# Patient Record
Sex: MALE | Race: WHITE
[De-identification: names, ages, dates, MRNs, and addresses within clinical notes are randomized per-mention and may not be internally consistent; named-entity substitution may affect disease eponyms.]

---

## 2021-03-13 ENCOUNTER — HOSPITAL ENCOUNTER (EMERGENCY)
Dept: HOSPITAL 61 - ER | Age: 46
Discharge: HOME | End: 2021-03-13
Payer: SELF-PAY

## 2021-03-13 VITALS — HEIGHT: 70 IN | WEIGHT: 220.46 LBS | BODY MASS INDEX: 31.56 KG/M2

## 2021-03-13 VITALS — SYSTOLIC BLOOD PRESSURE: 117 MMHG | DIASTOLIC BLOOD PRESSURE: 64 MMHG

## 2021-03-13 DIAGNOSIS — F12.90: ICD-10-CM

## 2021-03-13 DIAGNOSIS — R06.02: Primary | ICD-10-CM

## 2021-03-13 DIAGNOSIS — R11.0: ICD-10-CM

## 2021-03-13 DIAGNOSIS — F17.200: ICD-10-CM

## 2021-03-13 DIAGNOSIS — F19.90: ICD-10-CM

## 2021-03-13 DIAGNOSIS — R19.7: ICD-10-CM

## 2021-03-13 DIAGNOSIS — Z90.89: ICD-10-CM

## 2021-03-13 DIAGNOSIS — Z20.822: ICD-10-CM

## 2021-03-13 DIAGNOSIS — R05: ICD-10-CM

## 2021-03-13 LAB
INFLUENZA A PATIENT: NEGATIVE
INFLUENZA B PATIENT: NEGATIVE

## 2021-03-13 PROCEDURE — 99285 EMERGENCY DEPT VISIT HI MDM: CPT

## 2021-03-13 PROCEDURE — C9803 HOPD COVID-19 SPEC COLLECT: HCPCS

## 2021-03-13 PROCEDURE — U0003 INFECTIOUS AGENT DETECTION BY NUCLEIC ACID (DNA OR RNA); SEVERE ACUTE RESPIRATORY SYNDROME CORONAVIRUS 2 (SARS-COV-2) (CORONAVIRUS DISEASE [COVID-19]), AMPLIFIED PROBE TECHNIQUE, MAKING USE OF HIGH THROUGHPUT TECHNOLOGIES AS DESCRIBED BY CMS-2020-01-R: HCPCS

## 2021-03-13 PROCEDURE — 87804 INFLUENZA ASSAY W/OPTIC: CPT

## 2021-03-13 PROCEDURE — 71045 X-RAY EXAM CHEST 1 VIEW: CPT

## 2021-03-13 RX ADMIN — DEXAMETHASONE SODIUM PHOSPHATE ONE MG: 4 INJECTION, SOLUTION INTRAMUSCULAR; INTRAVENOUS at 10:49

## 2021-03-13 NOTE — ED.ADGEN
Past Medical History


Past Medical History:  No Pertinent History


Past Surgical History:  Appendectomy


Smoking Status:  Current Every Day Smoker


Alcohol Use:  Occasionally


Drug Use:  Heroin, Marijuana, Methamphetamine





General Adult


EDM:


Chief Complaint:  MULTIPLE COMPLAINTS





HPI:


HPI:


Patient is 45-year-old male who presents to the emergency room complaining of 

cough, body aches, diarrhea, nausea, and generally feeling unwell.  This is been

ongoing for the last 4 days and his stayed the same.  He denies any, chest pain,

URI symptoms, fever, chills, sweats, abdominal pain, nausea, vomiting.  He is 

unsure if he has been exposed to anybody who has been ill.  He has not tried to 

take anything for symptoms at home.  He does not have any issues getting out of 

bed or walking long distances.  He does not have any medical problems.





Review of Systems:


Review of Systems:


Complete ROS is negative unless otherwise documented in HPI





Current Medications:





Current Medications








 Medications


  (Trade)  Dose


 Ordered  Sig/Kaleb  Start Time


 Stop Time Status Last Admin


Dose Admin


 


 Dexamethasone


 Sodium Phosphate


  (Decadron)  10 mg  1X  ONCE  3/13/21 10:30


 3/13/21 10:31 DC 3/13/21 10:49


10 MG











Allergies:


Allergies:





Allergies








Coded Allergies Type Severity Reaction Last Updated Verified


 


  No Known Drug Allergies    4/21/16 No











Physical Exam:


PE:


General: Awake, alert, NAD. Well Nourished, well hydrated. Cooperative


HEENT: Atraumatic, EOMI, PERRL, airway patent, moist oral mucosa


Neck: Supple, trachea midline


Respiratory: CTA bilaterally, normal effort, no wheezing/crackles


CV: RRR, no murmur, cap refill <2


GI: Soft, nondistended, nontender, no masses


MSK: No obvious deformities


Skin: Warm, dry, intact


Neuro: A&O x3, speech NL, sensory and motor grossly intact, no focal deficits


Psych: Normal affect, normal mood, not suicidal or homicidal





Current Patient Data:


Labs:





                                Laboratory Tests








Test


 3/13/21


10:52


 


Influenza Type A Antigen


 Negative


(NEGATIVE)


 


Influenza Type B Antigen


 Negative


(NEGATIVE)








Vital Signs:





                                   Vital Signs








  Date Time  Temp Pulse Resp B/P (MAP) Pulse Ox O2 Delivery O2 Flow Rate FiO2


 


3/13/21 09:20 99.0 89 16 122/76 (91) 95 Room Air  





 99.0       











EKG:


EKG:


[]





Heart Score:


C/O Chest Pain:  N/A


Risk Factors:


Risk Factors:  DM, Current or recent (<one month) smoker, HTN, HLP, family 

history of CAD, obesity.


Risk Scores:


Score 0 - 3:  2.5% MACE over next 6 weeks - Discharge Home


Score 4 - 6:  20.3% MACE over next 6 weeks - Admit for Clinical Observation


Score 7 - 10:  72.7% MACE over next 6 weeks - Early Invasive Strategies





Radiology/Procedures:


Radiology/Procedures:


[]





Course & Med Decision Making:


Course & Med Decision Making


Pertinent Labs and Imaging studies reviewed. (See chart for details)





Patient is a 45-year-old male who presents to the emergency room with viral 

symptoms.  At this time there is concern for the novel coronavirus 19.  Risk 

stratifying work-up was ordered including chest x-ray, d-dimer, CPK, CRP, LDH, 

troponin, ferritin, CBC, CMP.  Due to concern of COVID-19 I have discussed the 

importance of quarantining with the patient.  I have discussed with them that 

they should avoid grocery stores, gas stations, pharmacies, work, 

friends/family's homes.  I discussed with him that it is important that they do 

not expose themselves to anyone else for the next 14 days.  Chest x-ray does not

 show infiltrates at this time and patient will not be treated with empiric 

antibiotics.  I have discussed with the patient the course of the illness and we

 have discussed strict return precautions.  At this time patient does not need 

admission as they are stable, however it is possible that they may get worse 

over the next few days and we have discussed the importance of coming back if t

hey develop severe shortness of breath or any other symptoms that they are 

concerned about.  Patient's test results and vitals while in the ED were fully 

reviewed and discussed with the patient. Patient is stable and at this time does

 not need admission to the hospital. We have discussed strict return precautions

 and the importance of following up with their Primary Care Physician. Patient 

stated understanding and was given an opportunity to ask any questions.





Dragon Disclaimer:


Dragon Disclaimer:


This electronic medical record was generated, in whole or in part, using a voice

 recognition dictation system.





Departure


Departure


Impression:  


   Primary Impression:  


   Suspected 2019 novel coronavirus infection


Disposition:  01 DC HOME SELF CARE/HOMELESS


Condition:  STABLE


Referrals:  


NO PCP (PCP)





Additional Instructions:  


Thank you for visiting [facility]. We appreciate you trusting us 

with your care. If any additional problems come up please don't hesitate to 

return to visit us. Follow up with your primary care provider so they can plan 

additional care if needed and know about the problem that you had today. If 

symptoms worsen come back to the Emergency Department. Any concerning symptoms 

that start such as chest pain, shortness of air, weakness or numbness on one 

side of the body, running high fevers or any other concerning symptoms return to

 the ER.





You have a viral syndrome which may include symptoms like muscle aches, fevers, 

chills, runny nose, cough, sneezing, sore throat, nausea, vomiting, or diarrhea.

 One of the potential viruses that you may have is SARS-CoV-2, the virus that 

causes COVID-19, also known as the Coronavirus. You are just as likely to have a

 different viral infection such as the common cold, flu, etc. Most patients with

 the Coronavirus have mild symptoms and recover on their own. Resting, staying 

hydrated, and sleep based on known cases can be helpful. As of todays visit, 

you are well enough to go home and treat your symptoms with oral fluids and over

 the counter medications. 





Coronavirus testing is not performed on most people with mild symptoms who are 

being discharged from the emergency department.





If Coronavirus testing was performed today the results will not be available for

 possibly up to 3-4 days. If your result is positive you will be contacted. 





Please follow the following precautions at home:





1. Stay home except to get medical care.


2. As advised by the CDC, we recommend that you stay in your home and minimize 

contact with other people. We do not want you to spread the infection. 


3. Those who are older or have significant medical issues may have more severe 

symptoms from this infection. We recommend self-isolation FOR AT LEAST 7 DAYS 

after your 1st day of symptoms. AFTER you feel better please wait AT LEAST 

ANOTHER WEEK before returning to regular activities and being around other pe

ople.


4. IF you become sicker and have difficulty breathing, chest pain, are unable to

 eat/drink, severe vomiting, diarrhea, or weakness you may need to return to the

 Emergency Department.


5. You should restrict activities outside of your home, except for getting 

medical care. DO NOT go to work, school, or public areas. Avoid using public 

transportation, ride sharing, or taxis.


6. Separate yourself from other people in your home. You should use a separate 

bathroom if possible.


7. Avoid sharing personal household items such as dishes, cups, eating utensils,

 towels, etc.


8. Clean all high touch surfaces every day (door knobs, counter tops, etc). Use 

a household cleaning spray or wipe per label instructions.


9. Clean your hands often. Wash your hands with soap and water for at least 20 

seconds. 


10. Cover your mouth and nose when you cough or sneeze.


11. Throw used tissues in the trash and immediately wash your hands. 





For additional resources please visit the CDC website or the [facility] 

of [facility] ([phone]), you may also call 211 for further information.


Scripts


Benzonatate (TESSALON PERLE) 100 Mg Capsule


1 CAP PO TID for cough, #21 CAP


   Prov: MYRA DONNELLY MD         3/13/21 


Prednisone (PREDNISONE) 50 Mg Tablet


1 TAB PO DAILY, #5 TAB


   Prov: MYRA DONNELLY MD         3/13/21











MYRA DONNELLY MD              Mar 13, 2021 11:56

## 2021-03-13 NOTE — RAD
EXAM: Chest, single view.



HISTORY: Cough.



COMPARISON: None.



FINDINGS: A frontal view of the chest is obtained. There is no infiltrate, pleural effusion or pneumo
thorax. The heart is normal in size.



IMPRESSION: No acute pulmonary finding.



Electronically signed by: Edilma Beasley MD (3/13/2021 10:30 AM) [id number]